# Patient Record
Sex: FEMALE | Race: WHITE | Employment: FULL TIME | URBAN - METROPOLITAN AREA
[De-identification: names, ages, dates, MRNs, and addresses within clinical notes are randomized per-mention and may not be internally consistent; named-entity substitution may affect disease eponyms.]

---

## 2017-03-19 ENCOUNTER — HOSPITAL ENCOUNTER (OUTPATIENT)
Age: 62
Discharge: HOME | End: 2017-03-19
Payer: COMMERCIAL

## 2017-03-19 VITALS
HEART RATE: 61 BPM | OXYGEN SATURATION: 95 % | WEIGHT: 220 LBS | SYSTOLIC BLOOD PRESSURE: 122 MMHG | RESPIRATION RATE: 16 BRPM | DIASTOLIC BLOOD PRESSURE: 56 MMHG | TEMPERATURE: 99 F

## 2017-03-19 DIAGNOSIS — T50.Z95A VACCINATION REACTION, INITIAL ENCOUNTER: Primary | ICD-10-CM

## 2017-03-19 PROCEDURE — 99212 OFFICE O/P EST SF 10 MIN: CPT

## 2017-03-19 PROCEDURE — 99202 OFFICE O/P NEW SF 15 MIN: CPT

## 2017-03-19 NOTE — ED PROVIDER NOTES
Patient presents with:  Rash Skin Problem (integumentary)      HPI:     Melody Betts is a 62year old female who presents today with a chief complaint of redness, swelling, and irritation at the injection site of her shingles vaccine.   She received t 122/56 mmHg  Pulse 61  Temp(Src) 98.6 °F (37 °C) (Temporal)  Resp 16  Wt 99.791 kg  SpO2 95%  GENERAL: well developed, well nourished, well hydrated, no distress  SKIN: good skin turgor, see above description for rash. No hives.  No swelling to the face or

## 2017-03-19 NOTE — ED INITIAL ASSESSMENT (HPI)
Pt reports pain, swelling and itching to left lateral upper arm after receiving shingles vaccination on Friday. Pt denies SOB. Denies nausea/vomiting.  Afebrile

## 2017-03-24 PROBLEM — E66.9 OBESITY: Status: ACTIVE | Noted: 2017-03-24

## 2017-03-24 PROBLEM — E78.00 PURE HYPERCHOLESTEROLEMIA: Status: ACTIVE | Noted: 2017-03-24

## 2017-10-05 PROBLEM — M54.6 THORACIC SPINE PAIN: Status: ACTIVE | Noted: 2017-10-05

## 2017-10-05 PROBLEM — M25.561 CHRONIC PAIN OF RIGHT KNEE: Status: ACTIVE | Noted: 2017-10-05

## 2017-10-05 PROBLEM — G89.29 CHRONIC PAIN OF RIGHT KNEE: Status: ACTIVE | Noted: 2017-10-05

## 2017-10-26 ENCOUNTER — HOSPITAL ENCOUNTER (OUTPATIENT)
Dept: GENERAL RADIOLOGY | Age: 62
Discharge: HOME | End: 2017-10-26
Attending: ORTHOPAEDIC SURGERY
Payer: COMMERCIAL

## 2017-10-26 DIAGNOSIS — M25.561 RIGHT KNEE PAIN: ICD-10-CM

## 2017-10-26 PROCEDURE — 73562 X-RAY EXAM OF KNEE 3: CPT | Performed by: ORTHOPAEDIC SURGERY

## 2017-11-05 ENCOUNTER — HOSPITAL ENCOUNTER (OUTPATIENT)
Dept: MRI IMAGING | Age: 62
Discharge: HOME | End: 2017-11-05
Attending: ORTHOPAEDIC SURGERY
Payer: COMMERCIAL

## 2017-11-05 DIAGNOSIS — S83.104A ACUTE TRAUMATIC INTERNAL DERANGEMENT OF KNEE, RIGHT: ICD-10-CM

## 2017-11-05 DIAGNOSIS — S83.249A TORN MEDIAL MENISCUS: ICD-10-CM

## 2017-11-05 PROCEDURE — 73721 MRI JNT OF LWR EXTRE W/O DYE: CPT | Performed by: ORTHOPAEDIC SURGERY

## 2018-01-11 NOTE — H&P
[facility]    PATIENT'S NAME: Armando FELIX PHYSICIAN: Pio Boone MD   PATIENT ACCOUNT#:   [de-identified]    LOCATION:  [facility]  MEDICAL RECORD #:   [id number]       YOB: 1955  ADMISSION DATE:       01/12/201 surgical management with arthroscopy and partial medial meniscectomy recognizing that some degree of osteoarthritic involvement of the knee would be identified.   The patient was advised this could be carried out on an elective basis, and with this in mind INTACT.       DIAGNOSIS: MEDIAL MENISCUS TEAR OF RIGHT KNEE. PLAN: RIGHT KNEE ARTHROSCOPY WITH PARTIAL MEDIAL MENISCECTOMY.       Dictated By Chiquita Freed.  MD Mely  d: 01/11/2018 07:35:06  t: 01/11/2018 08:35:16  [facility] [id number]  [facility]/

## 2018-01-12 ENCOUNTER — ANESTHESIA EVENT (OUTPATIENT)
Dept: SURGERY | Facility: HOSPITAL | Age: 63
End: 2018-01-12
Payer: COMMERCIAL

## 2018-01-12 ENCOUNTER — SURGERY (OUTPATIENT)
Age: 63
End: 2018-01-12

## 2018-01-12 ENCOUNTER — HOSPITAL ENCOUNTER (OUTPATIENT)
Facility: HOSPITAL | Age: 63
Setting detail: HOSPITAL OUTPATIENT SURGERY
Discharge: HOME | End: 2018-01-12
Attending: ORTHOPAEDIC SURGERY | Admitting: ORTHOPAEDIC SURGERY
Payer: COMMERCIAL

## 2018-01-12 ENCOUNTER — ANESTHESIA (OUTPATIENT)
Dept: SURGERY | Facility: HOSPITAL | Age: 63
End: 2018-01-12
Payer: COMMERCIAL

## 2018-01-12 VITALS
DIASTOLIC BLOOD PRESSURE: 55 MMHG | RESPIRATION RATE: 18 BRPM | SYSTOLIC BLOOD PRESSURE: 105 MMHG | OXYGEN SATURATION: 94 % | TEMPERATURE: 98 F | HEART RATE: 78 BPM | HEIGHT: 63 IN | WEIGHT: 226 LBS | BODY MASS INDEX: 40.04 KG/M2

## 2018-01-12 DIAGNOSIS — M25.561 CHRONIC PAIN OF RIGHT KNEE: Primary | ICD-10-CM

## 2018-01-12 DIAGNOSIS — S83.241A ACUTE MEDIAL MENISCUS TEAR OF RIGHT KNEE, INITIAL ENCOUNTER: ICD-10-CM

## 2018-01-12 DIAGNOSIS — G89.29 CHRONIC PAIN OF RIGHT KNEE: Primary | ICD-10-CM

## 2018-01-12 PROCEDURE — 0SBC4ZZ EXCISION OF RIGHT KNEE JOINT, PERCUTANEOUS ENDOSCOPIC APPROACH: ICD-10-PCS | Performed by: ORTHOPAEDIC SURGERY

## 2018-01-12 RX ORDER — NALOXONE HYDROCHLORIDE 0.4 MG/ML
80 INJECTION, SOLUTION INTRAMUSCULAR; INTRAVENOUS; SUBCUTANEOUS AS NEEDED
Status: DISCONTINUED | OUTPATIENT
Start: 2018-01-12 | End: 2018-01-12

## 2018-01-12 RX ORDER — BUPIVACAINE HYDROCHLORIDE 5 MG/ML
INJECTION, SOLUTION EPIDURAL; INTRACAUDAL AS NEEDED
Status: DISCONTINUED | OUTPATIENT
Start: 2018-01-12 | End: 2018-01-12 | Stop reason: HOSPADM

## 2018-01-12 RX ORDER — METOCLOPRAMIDE 10 MG/1
10 TABLET ORAL ONCE
Status: COMPLETED | OUTPATIENT
Start: 2018-01-12 | End: 2018-01-12

## 2018-01-12 RX ORDER — HYDROMORPHONE HYDROCHLORIDE 1 MG/ML
0.2 INJECTION, SOLUTION INTRAMUSCULAR; INTRAVENOUS; SUBCUTANEOUS EVERY 5 MIN PRN
Status: DISCONTINUED | OUTPATIENT
Start: 2018-01-12 | End: 2018-01-12

## 2018-01-12 RX ORDER — HALOPERIDOL 5 MG/ML
0.25 INJECTION INTRAMUSCULAR ONCE AS NEEDED
Status: DISCONTINUED | OUTPATIENT
Start: 2018-01-12 | End: 2018-01-12

## 2018-01-12 RX ORDER — ONDANSETRON 2 MG/ML
4 INJECTION INTRAMUSCULAR; INTRAVENOUS ONCE AS NEEDED
Status: DISCONTINUED | OUTPATIENT
Start: 2018-01-12 | End: 2018-01-12

## 2018-01-12 RX ORDER — HYDROCODONE BITARTRATE AND ACETAMINOPHEN 5; 325 MG/1; MG/1
2 TABLET ORAL EVERY 4 HOURS PRN
Status: DISCONTINUED | OUTPATIENT
Start: 2018-01-12 | End: 2018-01-12

## 2018-01-12 RX ORDER — EPHEDRINE SULFATE 50 MG/ML
INJECTION, SOLUTION INTRAVENOUS AS NEEDED
Status: DISCONTINUED | OUTPATIENT
Start: 2018-01-12 | End: 2018-01-12 | Stop reason: SURG

## 2018-01-12 RX ORDER — SODIUM CHLORIDE, SODIUM LACTATE, POTASSIUM CHLORIDE, CALCIUM CHLORIDE 600; 310; 30; 20 MG/100ML; MG/100ML; MG/100ML; MG/100ML
INJECTION, SOLUTION INTRAVENOUS CONTINUOUS
Status: DISCONTINUED | OUTPATIENT
Start: 2018-01-12 | End: 2018-01-12

## 2018-01-12 RX ORDER — DEXAMETHASONE SODIUM PHOSPHATE 4 MG/ML
VIAL (ML) INJECTION AS NEEDED
Status: DISCONTINUED | OUTPATIENT
Start: 2018-01-12 | End: 2018-01-12 | Stop reason: SURG

## 2018-01-12 RX ORDER — FAMOTIDINE 20 MG/1
20 TABLET ORAL ONCE
Status: COMPLETED | OUTPATIENT
Start: 2018-01-12 | End: 2018-01-12

## 2018-01-12 RX ORDER — HYDROMORPHONE HYDROCHLORIDE 1 MG/ML
0.6 INJECTION, SOLUTION INTRAMUSCULAR; INTRAVENOUS; SUBCUTANEOUS EVERY 5 MIN PRN
Status: DISCONTINUED | OUTPATIENT
Start: 2018-01-12 | End: 2018-01-12

## 2018-01-12 RX ORDER — HYDROCODONE BITARTRATE AND ACETAMINOPHEN 5; 325 MG/1; MG/1
1 TABLET ORAL EVERY 4 HOURS PRN
Status: DISCONTINUED | OUTPATIENT
Start: 2018-01-12 | End: 2018-01-12

## 2018-01-12 RX ORDER — MIDAZOLAM HYDROCHLORIDE 1 MG/ML
INJECTION INTRAMUSCULAR; INTRAVENOUS AS NEEDED
Status: DISCONTINUED | OUTPATIENT
Start: 2018-01-12 | End: 2018-01-12 | Stop reason: SURG

## 2018-01-12 RX ORDER — MORPHINE SULFATE 10 MG/ML
6 INJECTION, SOLUTION INTRAMUSCULAR; INTRAVENOUS EVERY 10 MIN PRN
Status: DISCONTINUED | OUTPATIENT
Start: 2018-01-12 | End: 2018-01-12

## 2018-01-12 RX ORDER — ACETAMINOPHEN 325 MG/1
650 TABLET ORAL EVERY 4 HOURS PRN
Status: DISCONTINUED | OUTPATIENT
Start: 2018-01-12 | End: 2018-01-12

## 2018-01-12 RX ORDER — ONDANSETRON 2 MG/ML
INJECTION INTRAMUSCULAR; INTRAVENOUS AS NEEDED
Status: DISCONTINUED | OUTPATIENT
Start: 2018-01-12 | End: 2018-01-12 | Stop reason: SURG

## 2018-01-12 RX ORDER — HYDROCODONE BITARTRATE AND ACETAMINOPHEN 5; 325 MG/1; MG/1
2 TABLET ORAL AS NEEDED
Status: DISCONTINUED | OUTPATIENT
Start: 2018-01-12 | End: 2018-01-12

## 2018-01-12 RX ORDER — MORPHINE SULFATE 4 MG/ML
4 INJECTION, SOLUTION INTRAMUSCULAR; INTRAVENOUS EVERY 10 MIN PRN
Status: DISCONTINUED | OUTPATIENT
Start: 2018-01-12 | End: 2018-01-12

## 2018-01-12 RX ORDER — LIDOCAINE HYDROCHLORIDE 10 MG/ML
INJECTION, SOLUTION EPIDURAL; INFILTRATION; INTRACAUDAL; PERINEURAL AS NEEDED
Status: DISCONTINUED | OUTPATIENT
Start: 2018-01-12 | End: 2018-01-12 | Stop reason: SURG

## 2018-01-12 RX ORDER — KETOROLAC TROMETHAMINE 30 MG/ML
INJECTION, SOLUTION INTRAMUSCULAR; INTRAVENOUS AS NEEDED
Status: DISCONTINUED | OUTPATIENT
Start: 2018-01-12 | End: 2018-01-12 | Stop reason: SURG

## 2018-01-12 RX ORDER — GLYCOPYRROLATE 0.2 MG/ML
INJECTION INTRAMUSCULAR; INTRAVENOUS AS NEEDED
Status: DISCONTINUED | OUTPATIENT
Start: 2018-01-12 | End: 2018-01-12 | Stop reason: SURG

## 2018-01-12 RX ORDER — HYDROMORPHONE HYDROCHLORIDE 1 MG/ML
0.4 INJECTION, SOLUTION INTRAMUSCULAR; INTRAVENOUS; SUBCUTANEOUS EVERY 5 MIN PRN
Status: DISCONTINUED | OUTPATIENT
Start: 2018-01-12 | End: 2018-01-12

## 2018-01-12 RX ORDER — HYDROCODONE BITARTRATE AND ACETAMINOPHEN 5; 325 MG/1; MG/1
1 TABLET ORAL AS NEEDED
Status: DISCONTINUED | OUTPATIENT
Start: 2018-01-12 | End: 2018-01-12

## 2018-01-12 RX ORDER — MORPHINE SULFATE 2 MG/ML
2 INJECTION, SOLUTION INTRAMUSCULAR; INTRAVENOUS EVERY 10 MIN PRN
Status: DISCONTINUED | OUTPATIENT
Start: 2018-01-12 | End: 2018-01-12

## 2018-01-12 RX ADMIN — KETOROLAC TROMETHAMINE 15 MG: 30 INJECTION, SOLUTION INTRAMUSCULAR; INTRAVENOUS at 08:25:00

## 2018-01-12 RX ADMIN — DEXAMETHASONE SODIUM PHOSPHATE 4 MG: 4 MG/ML VIAL (ML) INJECTION at 07:29:00

## 2018-01-12 RX ADMIN — EPHEDRINE SULFATE 5 MG: 50 INJECTION, SOLUTION INTRAVENOUS at 07:46:00

## 2018-01-12 RX ADMIN — SODIUM CHLORIDE, SODIUM LACTATE, POTASSIUM CHLORIDE, CALCIUM CHLORIDE: 600; 310; 30; 20 INJECTION, SOLUTION INTRAVENOUS at 07:27:00

## 2018-01-12 RX ADMIN — MIDAZOLAM HYDROCHLORIDE 2 MG: 1 INJECTION INTRAMUSCULAR; INTRAVENOUS at 07:27:00

## 2018-01-12 RX ADMIN — LIDOCAINE HYDROCHLORIDE 50 MG: 10 INJECTION, SOLUTION EPIDURAL; INFILTRATION; INTRACAUDAL; PERINEURAL at 07:29:00

## 2018-01-12 RX ADMIN — GLYCOPYRROLATE 0.2 MG: 0.2 INJECTION INTRAMUSCULAR; INTRAVENOUS at 07:28:00

## 2018-01-12 RX ADMIN — EPHEDRINE SULFATE 5 MG: 50 INJECTION, SOLUTION INTRAVENOUS at 07:38:00

## 2018-01-12 RX ADMIN — SODIUM CHLORIDE, SODIUM LACTATE, POTASSIUM CHLORIDE, CALCIUM CHLORIDE: 600; 310; 30; 20 INJECTION, SOLUTION INTRAVENOUS at 08:35:00

## 2018-01-12 RX ADMIN — ONDANSETRON 4 MG: 2 INJECTION INTRAMUSCULAR; INTRAVENOUS at 07:29:00

## 2018-01-12 NOTE — ANESTHESIA POSTPROCEDURE EVALUATION
Patient: You Harrison    Procedure Summary     Date:  01/12/18 Room / Location:  [address] [place] [address] MAIN OR 03 / [facility] OR    Anesthesia Start:  5812 Anesthesia Stop:  2729    Procedure:  KNEE ARTHROSCOPY (Right Knee) Diagnosis:  (medial meniscus tear right knee,

## 2018-01-12 NOTE — INTERVAL H&P NOTE
Pre-op Diagnosis: medial meniscus tear right knee    The above referenced H&P was reviewed by Carmen Orr MD on 1/12/2018, the patient was examined and no significant changes have occurred in the patient's condition since the H&P was performed.   I discus

## 2018-01-12 NOTE — BRIEF OP NOTE
[address] UNIT  Brief Op Note     Deandre Costa Location: OR   [facility] [id number] MRN [id number]   Admission Date 1/12/2018 Operation Date 1/12/2018   Attending Physician Zi Garcia MD Operating Physician Yanet Sousa MD

## 2018-01-12 NOTE — ANESTHESIA PREPROCEDURE EVALUATION
Anesthesia PreOp Note    HPI:     Ruy Starr is a 65year old female who presents for preoperative consultation requested by: Paul Gonzalez MD    Date of Surgery: 1/12/2018    Procedure(s):  KNEE ARTHROSCOPY  Indication: medial meniscus tear right status: Never Smoker    Smokeless tobacco: Never Used    Alcohol use Yes    Comment: very rare    Drug use: No    Sexual activity: Not on file     Other Topics Concern   None on file     Social History Narrative   None on file       Available pre-op labs r OBCOLETTE  1/12/2018 7:18 AM

## 2018-01-15 NOTE — OPERATIVE REPORT
[facility]    PATIENT'S NAME: Armando FELIX PHYSICIAN: Pio Boone MD   OPERATING PHYSICIAN: Pio Boone MD   PATIENT ACCOUNT#:   [de-identified]    LOCATION:  [address]  MEDICAL RECORD #:   [id number]       DATE a 19-gauge needle was delivered to the knee joint, which was then injected with 60 mL of normal saline. A  suprapatellar cannula with sharp trocar was then placed through the superolateral portal into the knee joint with free flow of saline.   Saline inflo meniscus extending to the junction with the body. The medial meniscus lesion was next addressed using a 4 mm Arthrex Torpedo powered shaver which was used to perform initial resection of the meniscus tear.   Repeat probe exam showed residual areas of invol involving less than 50% of the articular surface. The lateral femoral condyle was unremarkable.   The lateral meniscus had some mild free edge fraying, but on further inspection and probe exam of the posterior horn, undersurface, and periphery, no tears we

## 2018-01-26 ENCOUNTER — OFFICE VISIT (OUTPATIENT)
Dept: PHYSICAL THERAPY | Age: 63
End: 2018-01-26
Attending: ORTHOPAEDIC SURGERY
Payer: COMMERCIAL

## 2018-01-26 DIAGNOSIS — M23.000 CYST OF LATERAL MENISCUS OF RIGHT KNEE: ICD-10-CM

## 2018-01-26 PROCEDURE — 97161 PT EVAL LOW COMPLEX 20 MIN: CPT | Performed by: PHYSICAL THERAPIST

## 2018-01-26 PROCEDURE — 97110 THERAPEUTIC EXERCISES: CPT | Performed by: PHYSICAL THERAPIST

## 2018-01-26 NOTE — PROGRESS NOTES
LOWER EXTREMITY EVALUATION:   Referring Physician: Dr. Anabel Vyas  Date of Onset: 1/1218 Date of Service: 1/26/2018   Diagnosis: R knee menisectomy  PATIENT SUMMARY:   Janet Hughes is a 65year old y/o female who presents to therapy today with complaints Knee Foot/Ankle   Flexion: R 4/5; L 5/5  Extension: R 4/5; L 5/5  Abduction: R 4/5; L 4+/5   Flexion: R 4 minus/5; L 5/5  Extension: R 4minus/5; L 5/5    DF: R 5/5; L 5/5  PF: R 5/5; L 5/5  INV: R 5/5; L 5/5  EV: R 5/5; L 5/5         Balance: SLS: R 2 seco furnished under this plan of treatment and while under my care.     X___________________________________________________Date______________    Certification From: 9/74/3470  To:4/26/2018

## 2018-01-31 ENCOUNTER — OFFICE VISIT (OUTPATIENT)
Dept: PHYSICAL THERAPY | Age: 63
End: 2018-01-31
Attending: ORTHOPAEDIC SURGERY
Payer: COMMERCIAL

## 2018-01-31 PROCEDURE — 97110 THERAPEUTIC EXERCISES: CPT | Performed by: PHYSICAL THERAPIST

## 2018-01-31 NOTE — PROGRESS NOTES
Diagnosis: R knee menisectomy   # of Visits:  2        Next MD visit: none scheduled  Fall Risk: standard         Precautions: n/a           Medication Changes since last visit?: No  Subjective: Patient reports moving better and no pain at the knee    Obje

## 2018-02-01 ENCOUNTER — APPOINTMENT (OUTPATIENT)
Dept: PHYSICAL THERAPY | Age: 63
End: 2018-02-01
Attending: ORTHOPAEDIC SURGERY
Payer: COMMERCIAL

## 2018-02-06 ENCOUNTER — OFFICE VISIT (OUTPATIENT)
Dept: PHYSICAL THERAPY | Age: 63
End: 2018-02-06
Attending: ORTHOPAEDIC SURGERY
Payer: COMMERCIAL

## 2018-02-06 PROCEDURE — 97110 THERAPEUTIC EXERCISES: CPT | Performed by: PHYSICAL THERAPIST

## 2018-02-06 NOTE — PROGRESS NOTES
Diagnosis: R knee menisectomy   # of Visits:  4      Next MD visit: none scheduled  Fall Risk: standard         Precautions: n/a           Medication Changes since last visit?: No  Subjective: Patient reports the knee has been feeling good, really no pain

## 2018-02-08 ENCOUNTER — OFFICE VISIT (OUTPATIENT)
Dept: PHYSICAL THERAPY | Age: 63
End: 2018-02-08
Attending: ORTHOPAEDIC SURGERY
Payer: COMMERCIAL

## 2018-02-08 PROCEDURE — 97110 THERAPEUTIC EXERCISES: CPT

## 2018-02-13 ENCOUNTER — OFFICE VISIT (OUTPATIENT)
Dept: PHYSICAL THERAPY | Age: 63
End: 2018-02-13
Attending: ORTHOPAEDIC SURGERY
Payer: COMMERCIAL

## 2018-02-13 PROCEDURE — 97110 THERAPEUTIC EXERCISES: CPT

## 2018-02-13 NOTE — PROGRESS NOTES
Diagnosis: R knee menisectomy   # of Visits:  6      Next MD visit: none scheduled  Fall Risk: standard         Precautions: n/a           Medication Changes since last visit?: No   Subjective: Patient stated that her knee is feeling much better and did a

## 2018-02-14 ENCOUNTER — OFFICE VISIT (OUTPATIENT)
Dept: PHYSICAL THERAPY | Age: 63
End: 2018-02-14
Attending: ORTHOPAEDIC SURGERY
Payer: COMMERCIAL

## 2018-02-14 PROCEDURE — 97110 THERAPEUTIC EXERCISES: CPT | Performed by: PHYSICAL THERAPIST

## 2018-02-14 NOTE — PROGRESS NOTES
Diagnosis: R knee menisectomy   # of Visits:  7      Next MD visit: none scheduled  Fall Risk: standard         Precautions: n/a           Medication Changes since last visit?: No   Subjective: Patient reports no problems with driving.   States pain at knee

## 2018-02-20 ENCOUNTER — OFFICE VISIT (OUTPATIENT)
Dept: PHYSICAL THERAPY | Age: 63
End: 2018-02-20
Attending: ORTHOPAEDIC SURGERY
Payer: COMMERCIAL

## 2018-02-20 PROCEDURE — 97110 THERAPEUTIC EXERCISES: CPT

## 2018-02-20 NOTE — PROGRESS NOTES
Diagnosis: R knee menisectomy   # of Visits:  8     Next MD visit: none scheduled  Fall Risk: standard         Precautions: n/a           Medication Changes since last visit?: No   Subjective: Patient stated that she saw her MD last week and he wants her t strengthening LE's as per MD recommendation.       Charges: Ex 3       Total Timed Treatment: 45 min  Total Treatment Time: 45 min

## 2018-02-23 ENCOUNTER — APPOINTMENT (OUTPATIENT)
Dept: PHYSICAL THERAPY | Age: 63
End: 2018-02-23
Attending: ORTHOPAEDIC SURGERY
Payer: COMMERCIAL

## 2018-02-27 ENCOUNTER — OFFICE VISIT (OUTPATIENT)
Dept: PHYSICAL THERAPY | Age: 63
End: 2018-02-27
Attending: ORTHOPAEDIC SURGERY
Payer: COMMERCIAL

## 2018-02-27 PROCEDURE — 97110 THERAPEUTIC EXERCISES: CPT | Performed by: PHYSICAL THERAPIST

## 2018-02-27 NOTE — PROGRESS NOTES
Diagnosis: R knee menisectomy   # of Visits:  9    Next MD visit: March 14, 2018  Fall Risk: standard         Precautions: n/a           Medication Changes since last visit?: No   Subjective: Patient reports she would like to try the stairs as in choir and

## 2018-03-01 ENCOUNTER — OFFICE VISIT (OUTPATIENT)
Dept: PHYSICAL THERAPY | Age: 63
End: 2018-03-01
Attending: PEDIATRICS
Payer: COMMERCIAL

## 2018-03-01 PROCEDURE — 97110 THERAPEUTIC EXERCISES: CPT

## 2018-03-01 NOTE — PROGRESS NOTES
Diagnosis: R knee menisectomy   # of Visits:  10    Next MD visit: March 14, 2018  Fall Risk: standard         Precautions: n/a           Medication Changes since last visit?: No   Subjective: Patient stated that she is able to ambulate all day with no rosa isela

## 2018-03-06 ENCOUNTER — OFFICE VISIT (OUTPATIENT)
Dept: PHYSICAL THERAPY | Age: 63
End: 2018-03-06
Attending: PEDIATRICS
Payer: COMMERCIAL

## 2018-03-06 PROCEDURE — 97110 THERAPEUTIC EXERCISES: CPT | Performed by: PHYSICAL THERAPIST

## 2018-03-06 NOTE — PROGRESS NOTES
Diagnosis: R knee menisectomy   # of Visits:  11    Next MD visit: March 14, 2018  Fall Risk: standard         Precautions: n/a           Medication Changes since last visit?: No   Subjective: Patient stated the last 2 days the knee has been a little achy

## 2018-03-08 ENCOUNTER — OFFICE VISIT (OUTPATIENT)
Dept: PHYSICAL THERAPY | Age: 63
End: 2018-03-08
Attending: PEDIATRICS
Payer: COMMERCIAL

## 2018-03-08 PROCEDURE — 97110 THERAPEUTIC EXERCISES: CPT | Performed by: PHYSICAL THERAPIST

## 2018-03-08 NOTE — PROGRESS NOTES
Diagnosis: R knee menisectomy   # of Visits:  12    Next MD visit: March 14, 2018  Fall Risk: standard         Precautions: n/a           Medication Changes since last visit?: No   Subjective: Patient states knee has not been sore, stopped the rubbing at t

## 2018-03-13 ENCOUNTER — OFFICE VISIT (OUTPATIENT)
Dept: PHYSICAL THERAPY | Age: 63
End: 2018-03-13
Attending: PEDIATRICS
Payer: COMMERCIAL

## 2018-03-13 PROCEDURE — 97110 THERAPEUTIC EXERCISES: CPT | Performed by: PHYSICAL THERAPIST

## 2018-03-13 NOTE — PROGRESS NOTES
Diagnosis: R knee menisectomy   # of Visits:  13    Next MD visit: March 14, 2018  Fall Risk: standard         Precautions: n/a           Medication Changes since last visit?: No   Subjective: Patient states no pain at the knee today.   States will see the

## 2018-03-14 ENCOUNTER — OFFICE VISIT (OUTPATIENT)
Dept: PHYSICAL THERAPY | Age: 63
End: 2018-03-14
Attending: ORTHOPAEDIC SURGERY
Payer: COMMERCIAL

## 2018-03-14 PROCEDURE — 97110 THERAPEUTIC EXERCISES: CPT

## 2018-03-14 NOTE — PROGRESS NOTES
Diagnosis: R knee menisectomy   # of Visits:  15 / 25    Next MD visit: March 14, 2018  Fall Risk: standard         Precautions: n/a         DATE : 3/14/18  Medication Changes since last visit?: No   Subjective: Patient stated that he saw her MD today and

## 2018-03-16 ENCOUNTER — APPOINTMENT (OUTPATIENT)
Dept: PHYSICAL THERAPY | Age: 63
End: 2018-03-16
Attending: ORTHOPAEDIC SURGERY
Payer: COMMERCIAL

## 2018-03-20 ENCOUNTER — OFFICE VISIT (OUTPATIENT)
Dept: PHYSICAL THERAPY | Age: 63
End: 2018-03-20
Attending: ORTHOPAEDIC SURGERY
Payer: COMMERCIAL

## 2018-03-20 PROCEDURE — 97110 THERAPEUTIC EXERCISES: CPT | Performed by: PHYSICAL THERAPIST

## 2018-03-20 NOTE — PROGRESS NOTES
Diagnosis: R knee menisectomy   # of Visits:  13 / 18    Next MD visit: April 25, 2018  Fall Risk: standard         Precautions: n/a          Medication Changes since last visit?: No   Subjective: Patient states using her cane less now during the day.   Abl

## 2018-03-23 ENCOUNTER — OFFICE VISIT (OUTPATIENT)
Dept: PHYSICAL THERAPY | Age: 63
End: 2018-03-23
Attending: ORTHOPAEDIC SURGERY
Payer: COMMERCIAL

## 2018-03-23 PROCEDURE — 97110 THERAPEUTIC EXERCISES: CPT | Performed by: PHYSICAL THERAPIST

## 2018-03-23 NOTE — PROGRESS NOTES
Diagnosis: R knee menisectomy   # of Visits:  12 / 18    Next MD visit: April 25, 2018  Fall Risk: standard         Precautions: n/a          Medication Changes since last visit?: No   Subjective: Patient states she forgot the cane walking here from work a

## 2018-03-27 ENCOUNTER — OFFICE VISIT (OUTPATIENT)
Dept: PHYSICAL THERAPY | Age: 63
End: 2018-03-27
Attending: ORTHOPAEDIC SURGERY
Payer: COMMERCIAL

## 2018-03-27 PROCEDURE — 97110 THERAPEUTIC EXERCISES: CPT

## 2018-03-27 NOTE — PROGRESS NOTES
Diagnosis: R knee menisectomy   # of Visits:  16 / 18    Next MD visit: April 25, 2018  Fall Risk: standard         Precautions: n/a          Medication Changes since last visit?: No   Subjective: Patient reports that she continue to have same difficulty w Timed Treatment: 45 min  Total Treatment Time: 45 min

## 2018-03-30 ENCOUNTER — OFFICE VISIT (OUTPATIENT)
Dept: PHYSICAL THERAPY | Age: 63
End: 2018-03-30
Attending: ORTHOPAEDIC SURGERY
Payer: COMMERCIAL

## 2018-03-30 PROCEDURE — 97110 THERAPEUTIC EXERCISES: CPT | Performed by: PHYSICAL THERAPIST

## 2018-03-30 NOTE — PROGRESS NOTES
Diagnosis: R knee menisectomy   # of Visits:  25 / 18    Next MD visit: April 25, 2018  Fall Risk: standard         Precautions: n/a          Medication Changes since last visit?: No   REQUESTING 6-8 ADDITIONAL VISITS TO CONTINUE STRENGTHENING/BALANCE WORK

## 2018-04-03 ENCOUNTER — OFFICE VISIT (OUTPATIENT)
Dept: PHYSICAL THERAPY | Age: 63
End: 2018-04-03
Attending: ORTHOPAEDIC SURGERY
Payer: COMMERCIAL

## 2018-04-03 PROCEDURE — 97110 THERAPEUTIC EXERCISES: CPT

## 2018-04-03 NOTE — PROGRESS NOTES
Diagnosis: R knee menisectomy   # of Visits:  23 / 18    Next MD visit: April 25, 2018  Fall Risk: standard         Precautions: n/a          Medication Changes since last visit?: No       Subjective: Patient stated that her knee felt sore for two days aft

## 2018-04-05 ENCOUNTER — OFFICE VISIT (OUTPATIENT)
Dept: PHYSICAL THERAPY | Age: 63
End: 2018-04-05
Attending: ORTHOPAEDIC SURGERY
Payer: COMMERCIAL

## 2018-04-05 PROCEDURE — 97110 THERAPEUTIC EXERCISES: CPT | Performed by: PHYSICAL THERAPIST

## 2018-04-05 NOTE — PROGRESS NOTES
Diagnosis: R knee menisectomy   # of Visits:  2 / 8    Next MD visit: April 25, 2018  Fall Risk: standard         Precautions: n/a          Medication Changes since last visit?: No       Subjective: Patient states one episode of pain at knee last night but

## 2018-04-09 ENCOUNTER — APPOINTMENT (OUTPATIENT)
Dept: PHYSICAL THERAPY | Age: 63
End: 2018-04-09
Attending: ORTHOPAEDIC SURGERY
Payer: COMMERCIAL

## 2018-04-10 ENCOUNTER — OFFICE VISIT (OUTPATIENT)
Dept: PHYSICAL THERAPY | Age: 63
End: 2018-04-10
Attending: ORTHOPAEDIC SURGERY
Payer: COMMERCIAL

## 2018-04-10 PROCEDURE — 97110 THERAPEUTIC EXERCISES: CPT

## 2018-04-10 NOTE — PROGRESS NOTES
Diagnosis: R knee menisectomy   # of Visits:  3 / 8    Next MD visit: April 25, 2018  Fall Risk: standard         Precautions: n/a          Medication Changes since last visit?: No       Subjective: Patient reports that she hurt her back over the weekend a

## 2018-04-11 ENCOUNTER — APPOINTMENT (OUTPATIENT)
Dept: PHYSICAL THERAPY | Age: 63
End: 2018-04-11
Attending: ORTHOPAEDIC SURGERY
Payer: COMMERCIAL

## 2018-04-12 ENCOUNTER — OFFICE VISIT (OUTPATIENT)
Dept: PHYSICAL THERAPY | Age: 63
End: 2018-04-12
Attending: ORTHOPAEDIC SURGERY
Payer: COMMERCIAL

## 2018-04-12 PROCEDURE — 97110 THERAPEUTIC EXERCISES: CPT

## 2018-04-12 NOTE — PROGRESS NOTES
Diagnosis: R knee menisectomy   # of Visits:  4 / 8    Next MD visit: April 25, 2018  Fall Risk: standard         Precautions: n/a          Medication Changes since last visit?: No       Subjective: Patient reports that her mid back feels better but she st

## 2018-04-17 ENCOUNTER — OFFICE VISIT (OUTPATIENT)
Dept: PHYSICAL THERAPY | Age: 63
End: 2018-04-17
Attending: ORTHOPAEDIC SURGERY
Payer: COMMERCIAL

## 2018-04-17 PROCEDURE — 97110 THERAPEUTIC EXERCISES: CPT | Performed by: PHYSICAL THERAPIST

## 2018-04-17 NOTE — PROGRESS NOTES
Diagnosis: R knee menisectomy   # of Visits:  4 / 8    Next MD visit: April 25, 2018  Fall Risk: standard         Precautions: n/a          Medication Changes since last visit?: No       Subjective: Patient reports last weekend started to get pain with home

## 2018-04-19 ENCOUNTER — OFFICE VISIT (OUTPATIENT)
Dept: PHYSICAL THERAPY | Age: 63
End: 2018-04-19
Attending: ORTHOPAEDIC SURGERY
Payer: COMMERCIAL

## 2018-04-19 PROCEDURE — 97110 THERAPEUTIC EXERCISES: CPT

## 2018-04-19 NOTE — PROGRESS NOTES
Diagnosis: R knee menisectomy   # of Visits:  5 / 8    Next MD visit: April 25, 2018  Fall Risk: standard         Precautions: n/a          Medication Changes since last visit?: No       Subjective: patient reports no new c/o and denies any knee pain today

## 2018-04-24 ENCOUNTER — OFFICE VISIT (OUTPATIENT)
Dept: PHYSICAL THERAPY | Age: 63
End: 2018-04-24
Attending: ORTHOPAEDIC SURGERY
Payer: COMMERCIAL

## 2018-04-24 PROCEDURE — 97110 THERAPEUTIC EXERCISES: CPT | Performed by: PHYSICAL THERAPIST

## 2018-04-24 NOTE — PROGRESS NOTES
Diagnosis: R knee menisectomy   # of Visits:  6/ 8    Next MD visit: April 25, 2018  Fall Risk: standard         Precautions: n/a          Medication Changes since last visit?: No       Subjective: Patient reports Sunday knee felt a little weak, better tod

## 2018-05-01 ENCOUNTER — APPOINTMENT (OUTPATIENT)
Dept: PHYSICAL THERAPY | Age: 63
End: 2018-05-01
Attending: ORTHOPAEDIC SURGERY
Payer: COMMERCIAL

## 2018-05-02 ENCOUNTER — OFFICE VISIT (OUTPATIENT)
Dept: PHYSICAL THERAPY | Age: 63
End: 2018-05-02
Attending: ORTHOPAEDIC SURGERY
Payer: COMMERCIAL

## 2018-05-02 PROCEDURE — 97110 THERAPEUTIC EXERCISES: CPT | Performed by: PHYSICAL THERAPIST

## 2018-05-02 NOTE — PROGRESS NOTES
Diagnosis: R knee menisectomy   # of Visits:  6/ 8    Next MD visit: April 25, 2018  Fall Risk: standard         Precautions: n/a          Medication Changes since last visit?: No       Subjective: Patient reports did see Dr. Lucy Griggs who said to continue the

## 2018-05-03 ENCOUNTER — APPOINTMENT (OUTPATIENT)
Dept: PHYSICAL THERAPY | Age: 63
End: 2018-05-03
Attending: ORTHOPAEDIC SURGERY
Payer: COMMERCIAL

## 2018-05-08 ENCOUNTER — APPOINTMENT (OUTPATIENT)
Dept: PHYSICAL THERAPY | Age: 63
End: 2018-05-08
Attending: ORTHOPAEDIC SURGERY
Payer: COMMERCIAL

## 2018-05-10 ENCOUNTER — APPOINTMENT (OUTPATIENT)
Dept: PHYSICAL THERAPY | Age: 63
End: 2018-05-10
Attending: ORTHOPAEDIC SURGERY
Payer: COMMERCIAL

## 2018-05-11 ENCOUNTER — APPOINTMENT (OUTPATIENT)
Dept: PHYSICAL THERAPY | Age: 63
End: 2018-05-11
Attending: ORTHOPAEDIC SURGERY
Payer: COMMERCIAL

## 2018-05-15 ENCOUNTER — APPOINTMENT (OUTPATIENT)
Dept: PHYSICAL THERAPY | Age: 63
End: 2018-05-15
Attending: ORTHOPAEDIC SURGERY
Payer: COMMERCIAL

## 2018-05-22 ENCOUNTER — APPOINTMENT (OUTPATIENT)
Dept: PHYSICAL THERAPY | Age: 63
End: 2018-05-22
Attending: ORTHOPAEDIC SURGERY
Payer: COMMERCIAL

## 2018-05-23 ENCOUNTER — TELEPHONE (OUTPATIENT)
Dept: PHYSICAL THERAPY | Age: 63
End: 2018-05-23

## 2018-05-23 ENCOUNTER — APPOINTMENT (OUTPATIENT)
Dept: PHYSICAL THERAPY | Age: 63
End: 2018-05-23
Attending: ORTHOPAEDIC SURGERY
Payer: COMMERCIAL

## 2018-05-25 ENCOUNTER — OFFICE VISIT (OUTPATIENT)
Dept: PHYSICAL THERAPY | Age: 63
End: 2018-05-25
Attending: ORTHOPAEDIC SURGERY
Payer: COMMERCIAL

## 2018-05-25 PROCEDURE — 97110 THERAPEUTIC EXERCISES: CPT | Performed by: PHYSICAL THERAPIST

## 2018-05-25 NOTE — PROGRESS NOTES
Diagnosis: R knee menisectomy   # of Visits:  7/ 8    Next MD visit: April 25, 2018  Fall Risk: standard         Precautions: n/a          Medication Changes since last visit?: No     Subjective: Patient reports did do well with her conference and able to

## 2018-05-29 ENCOUNTER — OFFICE VISIT (OUTPATIENT)
Dept: PHYSICAL THERAPY | Age: 63
End: 2018-05-29
Attending: ORTHOPAEDIC SURGERY
Payer: COMMERCIAL

## 2018-05-29 PROCEDURE — 97110 THERAPEUTIC EXERCISES: CPT | Performed by: PHYSICAL THERAPIST

## 2018-05-29 NOTE — PROGRESS NOTES
Diagnosis: R knee menisectomy   # of Visits:  8/ 8    Next MD visit: April 25, 2018  Fall Risk: standard         Precautions: n/a          Medication Changes since last visit?: No     Subjective: Patient reports the knee has continued to be good.

## 2018-05-30 ENCOUNTER — APPOINTMENT (OUTPATIENT)
Dept: PHYSICAL THERAPY | Age: 63
End: 2018-05-30
Attending: ORTHOPAEDIC SURGERY
Payer: COMMERCIAL

## 2019-01-13 ENCOUNTER — HOSPITAL ENCOUNTER (OUTPATIENT)
Age: 64
Discharge: HOME | End: 2019-01-13
Payer: COMMERCIAL

## 2019-01-13 ENCOUNTER — APPOINTMENT (OUTPATIENT)
Dept: GENERAL RADIOLOGY | Age: 64
End: 2019-01-13
Attending: NURSE PRACTITIONER
Payer: COMMERCIAL

## 2019-01-13 VITALS
RESPIRATION RATE: 20 BRPM | DIASTOLIC BLOOD PRESSURE: 52 MMHG | HEART RATE: 66 BPM | HEIGHT: 64 IN | TEMPERATURE: 97 F | SYSTOLIC BLOOD PRESSURE: 133 MMHG | BODY MASS INDEX: 40.63 KG/M2 | WEIGHT: 238 LBS | OXYGEN SATURATION: 95 %

## 2019-01-13 DIAGNOSIS — J40 BRONCHITIS: Primary | ICD-10-CM

## 2019-01-13 PROCEDURE — 99214 OFFICE O/P EST MOD 30 MIN: CPT

## 2019-01-13 PROCEDURE — 71046 X-RAY EXAM CHEST 2 VIEWS: CPT | Performed by: NURSE PRACTITIONER

## 2019-01-13 PROCEDURE — 99213 OFFICE O/P EST LOW 20 MIN: CPT

## 2019-01-13 RX ORDER — AZITHROMYCIN 250 MG/1
TABLET, FILM COATED ORAL
Qty: 1 PACKAGE | Refills: 0 | Status: SHIPPED | OUTPATIENT
Start: 2019-01-13 | End: 2019-01-18

## 2019-01-13 RX ORDER — BENZONATATE 100 MG/1
200 CAPSULE ORAL 3 TIMES DAILY PRN
Qty: 30 CAPSULE | Refills: 0 | Status: SHIPPED | OUTPATIENT
Start: 2019-01-13 | End: 2019-02-12 | Stop reason: ALTCHOICE

## 2019-01-13 NOTE — ED INITIAL ASSESSMENT (HPI)
Off of work for 4-5 days with chest congestion and cough. Concerned about bronchitis. No documented fever but c/o chills and feeling achy. No chest pain or SOB.

## 2019-01-13 NOTE — ED PROVIDER NOTES
Patient presents with:  Cough/URI      HPI:     Donovan Regan is a 59year old female who presents for evaluation and management of a chief complaint of a productive cough with green and yellow sputum for the last 5 days.   She states she has had some ROS:   Positive for stated complaint: cough, congestion, chills  All other systems reviewed and negative except as noted above. Constitutional and Vital Signs Reviewed.     Physical Exam:     Findings:    /52   Pulse 66   Temp 97.2 °F (36.2 °C) acute disease in the chest.     Dictated by (CST): Carlito Blair MD on 1/13/2019 at 13:44     Approved by (CST): Carlito Blair MD on 1/13/2019 at 13:45          The patient is aware of the x-ray results.   We had a long discussion about viral versus bacte

## 2019-02-14 LAB — HM MAMMOGRAPHY BILATERAL: NORMAL

## 2019-11-19 PROBLEM — Z12.39 SCREENING FOR BREAST CANCER: Status: ACTIVE | Noted: 2019-11-19

## 2019-11-19 PROBLEM — M25.561 CHRONIC PAIN OF RIGHT KNEE: Status: RESOLVED | Noted: 2017-10-05 | Resolved: 2019-11-19

## 2019-11-19 PROBLEM — M54.6 THORACIC SPINE PAIN: Status: RESOLVED | Noted: 2017-10-05 | Resolved: 2019-11-19

## 2019-11-19 PROBLEM — L98.8 SKIN LESION OF BREAST: Status: ACTIVE | Noted: 2019-11-19

## 2019-11-19 PROBLEM — G89.29 CHRONIC PAIN OF RIGHT KNEE: Status: RESOLVED | Noted: 2017-10-05 | Resolved: 2019-11-19

## 2019-11-19 PROBLEM — L85.3 DRY SKIN: Status: ACTIVE | Noted: 2019-11-19

## 2019-11-19 PROBLEM — E66.9 OBESITY (BMI 35.0-39.9 WITHOUT COMORBIDITY): Status: ACTIVE | Noted: 2019-11-19

## 2019-11-22 ENCOUNTER — LAB ENCOUNTER (OUTPATIENT)
Dept: LAB | Age: 64
End: 2019-11-22
Attending: INTERNAL MEDICINE
Payer: COMMERCIAL

## 2019-11-22 DIAGNOSIS — L98.8 SKIN LESION OF BREAST: ICD-10-CM

## 2019-11-22 DIAGNOSIS — E78.00 PURE HYPERCHOLESTEROLEMIA: ICD-10-CM

## 2019-11-22 DIAGNOSIS — L85.3 DRY SKIN: ICD-10-CM

## 2019-11-22 DIAGNOSIS — E66.9 OBESITY (BMI 35.0-39.9 WITHOUT COMORBIDITY): ICD-10-CM

## 2019-11-22 PROCEDURE — 36415 COLL VENOUS BLD VENIPUNCTURE: CPT

## 2019-11-22 PROCEDURE — 80053 COMPREHEN METABOLIC PANEL: CPT

## 2019-11-22 PROCEDURE — 80061 LIPID PANEL: CPT

## 2019-11-22 PROCEDURE — 85025 COMPLETE CBC W/AUTO DIFF WBC: CPT

## 2019-11-22 PROCEDURE — 84443 ASSAY THYROID STIM HORMONE: CPT

## 2019-12-14 ENCOUNTER — HOSPITAL ENCOUNTER (OUTPATIENT)
Dept: MAMMOGRAPHY | Age: 64
Discharge: HOME | End: 2019-12-14
Attending: INTERNAL MEDICINE
Payer: COMMERCIAL

## 2019-12-14 DIAGNOSIS — Z12.39 SCREENING FOR BREAST CANCER: ICD-10-CM

## 2019-12-14 PROCEDURE — 77067 SCR MAMMO BI INCL CAD: CPT | Performed by: INTERNAL MEDICINE

## 2019-12-14 PROCEDURE — 77063 BREAST TOMOSYNTHESIS BI: CPT | Performed by: INTERNAL MEDICINE

## 2019-12-26 PROBLEM — E78.00 ELEVATED SERUM CHOLESTEROL: Status: ACTIVE | Noted: 2019-12-26

## 2019-12-26 PROBLEM — E78.9 ELEVATED SERUM CHOLESTEROL: Status: ACTIVE | Noted: 2019-12-26

## 2020-06-26 NOTE — PROGRESS NOTES
Diagnosis: R knee menisectomy   # of Visits:  5      Next MD visit: none scheduled  Fall Risk: standard         Precautions: n/a           Medication Changes since last visit?: No   Subjective: Patient stated that her knee feels sore since last visit\" fro Follow up phone call for Left breast biopsy performed on 6/24/2020.    Patient provided with pathology results by Dr. Pugh.   Patient denies any questions about pathology results.    Patient understands a surgical consult is needed.  Dr. Richard's office has been notified and will be calling patient to set up appointment.     Patient denies pain without the use of OTC analgesics.  Denies any bleeding or s/s of infection.  Reviewed s/s of infection with patient and instructed her to continue to watch for s/s of infection and to call if any develop, or if she has any other problems, questions, or concerns.  Patient verbalizes understanding and confirms that she has my contact numbers.

## 2022-11-20 ENCOUNTER — HOSPITAL ENCOUNTER (EMERGENCY)
Facility: HOSPITAL | Age: 67
Discharge: HOME | End: 2022-11-20
Attending: EMERGENCY MEDICINE
Payer: MEDICARE

## 2022-11-20 VITALS
TEMPERATURE: 98 F | BODY MASS INDEX: 38 KG/M2 | HEART RATE: 63 BPM | SYSTOLIC BLOOD PRESSURE: 117 MMHG | DIASTOLIC BLOOD PRESSURE: 66 MMHG | RESPIRATION RATE: 18 BRPM | WEIGHT: 223 LBS | OXYGEN SATURATION: 95 %

## 2022-11-20 DIAGNOSIS — S61.212A LACERATION OF RIGHT MIDDLE FINGER WITHOUT FOREIGN BODY WITHOUT DAMAGE TO NAIL, INITIAL ENCOUNTER: Primary | ICD-10-CM

## 2022-11-20 PROCEDURE — 12001 RPR S/N/AX/GEN/TRNK 2.5CM/<: CPT

## 2022-11-20 PROCEDURE — 99283 EMERGENCY DEPT VISIT LOW MDM: CPT

## 2022-11-20 PROCEDURE — 99282 EMERGENCY DEPT VISIT SF MDM: CPT

## 2022-11-20 NOTE — ED QUICK NOTES
bandaid applied to affected wound by tech. Pt a/ox4, respirations unlabored, speech full/clear, gait steady, no acute distress. All ED orders completed. Pt instructed to return to ED for any new/severe s/s or as directed by provider, and to see PMD/specialist ASAP or as directed by provider.

## 2023-12-04 ENCOUNTER — APPOINTMENT (OUTPATIENT)
Dept: OBGYN | Age: 68
End: 2023-12-04

## 2024-01-29 ENCOUNTER — APPOINTMENT (OUTPATIENT)
Dept: OBGYN | Age: 69
End: 2024-01-29

## 2024-01-29 VITALS
BODY MASS INDEX: 40.2 KG/M2 | HEART RATE: 63 BPM | DIASTOLIC BLOOD PRESSURE: 73 MMHG | OXYGEN SATURATION: 92 % | WEIGHT: 235.5 LBS | SYSTOLIC BLOOD PRESSURE: 115 MMHG | TEMPERATURE: 97.8 F | HEIGHT: 64 IN

## 2024-01-29 DIAGNOSIS — Z12.31 ENCOUNTER FOR SCREENING MAMMOGRAM FOR MALIGNANT NEOPLASM OF BREAST: ICD-10-CM

## 2024-01-29 DIAGNOSIS — Z01.419 ROUTINE GYNECOLOGICAL EXAMINATION: Primary | ICD-10-CM

## 2024-01-29 PROCEDURE — G0101 CA SCREEN;PELVIC/BREAST EXAM: HCPCS | Performed by: OBSTETRICS & GYNECOLOGY

## 2024-01-29 ASSESSMENT — PATIENT HEALTH QUESTIONNAIRE - PHQ9
SUM OF ALL RESPONSES TO PHQ9 QUESTIONS 1 AND 2: 0
2. FEELING DOWN, DEPRESSED OR HOPELESS: NOT AT ALL
1. LITTLE INTEREST OR PLEASURE IN DOING THINGS: NOT AT ALL
SUM OF ALL RESPONSES TO PHQ9 QUESTIONS 1 AND 2: 0
SUM OF ALL RESPONSES TO PHQ9 QUESTIONS 1 AND 2: 0
2. FEELING DOWN, DEPRESSED OR HOPELESS: NOT AT ALL
1. LITTLE INTEREST OR PLEASURE IN DOING THINGS: NOT AT ALL
SUM OF ALL RESPONSES TO PHQ9 QUESTIONS 1 AND 2: 0
CLINICAL INTERPRETATION OF PHQ2 SCORE: NO FURTHER SCREENING NEEDED
CLINICAL INTERPRETATION OF PHQ2 SCORE: NO FURTHER SCREENING NEEDED

## 2024-01-29 ASSESSMENT — ENCOUNTER SYMPTOMS
CONSTIPATION: 1
CONSTITUTIONAL NEGATIVE: 1

## 2024-04-21 ENCOUNTER — HOSPITAL ENCOUNTER (OUTPATIENT)
Age: 69
Discharge: HOME | End: 2024-04-21
Payer: MEDICARE

## 2024-04-21 VITALS
TEMPERATURE: 98 F | RESPIRATION RATE: 16 BRPM | OXYGEN SATURATION: 94 % | HEART RATE: 90 BPM | SYSTOLIC BLOOD PRESSURE: 137 MMHG | DIASTOLIC BLOOD PRESSURE: 79 MMHG

## 2024-04-21 DIAGNOSIS — R05.1 ACUTE COUGH: ICD-10-CM

## 2024-04-21 DIAGNOSIS — J98.01 BRONCHOSPASM: Primary | ICD-10-CM

## 2024-04-21 LAB — SARS-COV-2 RNA RESP QL NAA+PROBE: NOT DETECTED

## 2024-04-21 PROCEDURE — 99213 OFFICE O/P EST LOW 20 MIN: CPT

## 2024-04-21 RX ORDER — AZITHROMYCIN 250 MG/1
TABLET, FILM COATED ORAL
Qty: 6 TABLET | Refills: 0 | Status: SHIPPED | OUTPATIENT
Start: 2024-04-21 | End: 2024-04-26

## 2024-04-21 RX ORDER — PREDNISONE 20 MG/1
40 TABLET ORAL DAILY
Qty: 10 TABLET | Refills: 0 | Status: SHIPPED | OUTPATIENT
Start: 2024-04-21 | End: 2024-04-26

## 2024-04-21 RX ORDER — ALBUTEROL SULFATE 90 UG/1
2 AEROSOL, METERED RESPIRATORY (INHALATION) EVERY 4 HOURS PRN
Qty: 1 EACH | Refills: 0 | Status: SHIPPED | OUTPATIENT
Start: 2024-04-21 | End: 2024-05-21

## 2024-04-21 NOTE — ED INITIAL ASSESSMENT (HPI)
Patient with uri symptoms starting on Tuesday.  Reports forceful coughing spells starting on Thursday/Friday.

## 2024-04-21 NOTE — ED PROVIDER NOTES
Patient Seen in: [facility]      History     Chief Complaint   Patient presents with    Cough/URI     Stated Complaint: cough/uri    Subjective:   HPI    68-year-old female, with URI symptoms that began on Tuesday.  She reports forceful coughing spells where she begins coughing and cannot stop.  She states she is nearly vomiting with coughing.  She now has subjective fever chills.No CP No SOB history of bronchitis.  She does not smoke.  No ear pain no sinus discomfort.  Objective:   Past Medical History:    Back problem    shoulder pain    Constipation    Hyperlipidemia    Obesity              Past Surgical History:   Procedure Laterality Date    Appendectomy      Dilation/curettage,diagnostic      Hysterectomy      Total abdom hysterectomy                  No pertinent social history.            Review of Systems    Positive for stated complaint: cough/uri  Other systems are as noted in HPI.  Constitutional and vital signs reviewed.      All other systems reviewed and negative except as noted above.    Physical Exam     ED Triage Vitals [04/21/24 0951]   /79   Pulse 90   Resp 16   Temp 98 °F (36.7 °C)   Temp src Temporal   SpO2 94 %   O2 Device None (Room air)       Current:/79   Pulse 90   Temp 98 °F (36.7 °C) (Temporal)   Resp 16   SpO2 94%         Physical Exam  Vitals and nursing note reviewed.   Constitutional:       Appearance: Normal appearance. She is not ill-appearing.   HENT:      Right Ear: Tympanic membrane normal.      Left Ear: Tympanic membrane normal.      Nose: No congestion.      Mouth/Throat:      Pharynx: No posterior oropharyngeal erythema.   Eyes:      Extraocular Movements: Extraocular movements intact.      Pupils: Pupils are equal, round, and reactive to light.   Cardiovascular:      Rate and Rhythm: Normal rate.      Pulses: Normal pulses.      Heart sounds: Normal heart sounds.   Pulmonary:      Effort: Pulmonary effort is normal.      Breath sounds: Normal  breath sounds.   Musculoskeletal:      Cervical back: Normal range of motion.   Skin:     General: Skin is warm and dry.   Neurological:      Mental Status: She is alert.             ED Course     Labs Reviewed   RAPID SARS-COV-2 BY PCR - Normal                      MDM        68-year-old female presents with the above complaints  URI, COVID, influenza, bronchitis, bronchospasm, pneumonia considered  Sats are 95% on room air  Breath sounds are clear to auscultation patient is afebrile otherwise well-appearing and states he feels well  Advise prednisone albuterol patient does have Tessalon Per  If symptoms do not improve then start Z-Yady as patient states she has had bronchitis worsening to pneumonia and is concerned.    Close PMD follow-up strict return precautions given                                   Medical Decision Making  Problems Addressed:  Acute cough: acute illness or injury  Bronchospasm: acute illness or injury    Risk  OTC drugs.  Prescription drug management.        Disposition and Plan     Clinical Impression:  1. Bronchospasm    2. Acute cough         Disposition:  Discharge  4/21/2024 10:25 am    Follow-up:  Jabier Kendrick MD  [address] [place], [place] [id number]  [phone]    Schedule an appointment as soon as possible for a visit   If symptoms worsen return to the IC or go to the ER          Medications Prescribed:  Discharge Medication List as of 4/21/2024 10:25 AM        START taking these medications    Details   azithromycin (ZITHROMAX Z-YADY) 250 MG Oral Tab 500 mg once followed by 250 mg daily x 4 days, Normal, Disp-6 tablet, R-0      albuterol 108 (90 Base) MCG/ACT Inhalation Aero Soln Inhale 2 puffs into the lungs every 4 (four) hours as needed for Wheezing., Normal, Disp-1 each, R-0      predniSONE 20 MG Oral Tab Take 2 tablets (40 mg total) by mouth daily for 5 days., Normal, Disp-10 tablet, R-0

## 2025-03-19 ENCOUNTER — APPOINTMENT (OUTPATIENT)
Dept: GENERAL RADIOLOGY | Age: 70
End: 2025-03-19
Attending: PHYSICIAN ASSISTANT
Payer: MEDICARE

## 2025-03-19 ENCOUNTER — HOSPITAL ENCOUNTER (OUTPATIENT)
Age: 70
Discharge: HOME | End: 2025-03-19
Payer: MEDICARE

## 2025-03-19 VITALS
TEMPERATURE: 99 F | OXYGEN SATURATION: 97 % | DIASTOLIC BLOOD PRESSURE: 56 MMHG | SYSTOLIC BLOOD PRESSURE: 118 MMHG | HEART RATE: 94 BPM | RESPIRATION RATE: 18 BRPM

## 2025-03-19 DIAGNOSIS — J06.9 VIRAL URI WITH COUGH: Primary | ICD-10-CM

## 2025-03-19 PROCEDURE — 99214 OFFICE O/P EST MOD 30 MIN: CPT

## 2025-03-19 PROCEDURE — 99213 OFFICE O/P EST LOW 20 MIN: CPT

## 2025-03-19 PROCEDURE — 71046 X-RAY EXAM CHEST 2 VIEWS: CPT | Performed by: PHYSICIAN ASSISTANT

## 2025-03-19 RX ORDER — ALBUTEROL SULFATE 90 UG/1
2 INHALANT RESPIRATORY (INHALATION) EVERY 4 HOURS PRN
Qty: 1 EACH | Refills: 0 | Status: SHIPPED | OUTPATIENT
Start: 2025-03-19 | End: 2025-04-18

## 2025-03-19 RX ORDER — BENZONATATE 100 MG/1
100 CAPSULE ORAL 3 TIMES DAILY PRN
Qty: 21 CAPSULE | Refills: 0 | Status: SHIPPED | OUTPATIENT
Start: 2025-03-19 | End: 2025-03-26

## 2025-03-19 NOTE — ED PROVIDER NOTES
Patient Seen in: [facility]      History     Chief Complaint   Patient presents with    Cough/URI     Stated Complaint: cough    Subjective:   HPI    68 yo female here with elevation of cough which started 5 days ago.  Patient states initially the cough was somewhat dry, nonproductive.  Feels that there is a change in the cough yesterday and it was more harsh, frequent.  She has no associated fever/chills, chest pain or shortness of breath.  She denies body aches or other URI symptoms.  Will continue OTC Robitussin for the symptoms.  She has taken 2 home COVID test which were negative.    Objective:     Past Medical History:    Back problem    shoulder pain    Constipation    Hyperlipidemia    Obesity              Past Surgical History:   Procedure Laterality Date    Appendectomy      Dilation/curettage,diagnostic      Hysterectomy      Total abdom hysterectomy                  No pertinent social history.            Review of Systems    Positive for stated complaint: cough  Other systems are as noted in HPI.  Constitutional and vital signs reviewed.      All other systems reviewed and negative except as noted above.    Physical Exam     ED Triage Vitals [03/19/25 1334]   /56   Pulse 94   Resp 18   Temp 98.7 °F (37.1 °C)   Temp src Oral   SpO2 97 %   O2 Device None (Room air)       Current Vitals:   Vital Signs  BP: 118/56  Pulse: 94  Resp: 18  Temp: 98.7 °F (37.1 °C)  Temp src: Oral    Oxygen Therapy  SpO2: 97 %  O2 Device: None (Room air)        Physical Exam  Vitals and nursing note reviewed.   Constitutional:       General: She is not in acute distress.  HENT:      Head: Normocephalic and atraumatic.      Right Ear: External ear normal.      Left Ear: External ear normal.      Nose: Nose normal.      Mouth/Throat:      Mouth: Mucous membranes are moist.   Eyes:      Extraocular Movements: Extraocular movements intact.      Pupils: Pupils are equal, round, and reactive to light.    Cardiovascular:      Rate and Rhythm: Normal rate.   Pulmonary:      Effort: Pulmonary effort is normal.   Abdominal:      General: Abdomen is flat.   Musculoskeletal:         General: Normal range of motion.      Cervical back: Normal range of motion.   Skin:     General: Skin is warm.   Neurological:      General: No focal deficit present.      Mental Status: She is alert and oriented to person, place, and time.   Psychiatric:         Mood and Affect: Mood normal.         Behavior: Behavior normal.             ED Course   Labs Reviewed - No data to display     69-year-old female presenting for evaluation of cough x 5 days.  Patient is afebrile and very well-appearing.  There is no hypoxia, no tachycardia and the lungs are clear with no wheezing or rhonchi    Ddx-viral URI with cough, bronchitis, pneumonia    Chest x-ray obtained negative for infiltrate or abnormality.  We did discuss supportive care and monitoring. Tessalon, albuterol rx'd for cough    Pcp follow up and return precautions advised           MDM              Medical Decision Making      Disposition and Plan     Clinical Impression:  1. Viral URI with cough         Disposition:  Discharge  3/19/2025  2:46 pm    Follow-up:  Jabier Kendrick MD  [address] [place] [id number]  [phone]                Medications Prescribed:  Discharge Medication List as of 3/19/2025  2:47 PM        START taking these medications    Details   benzonatate 100 MG Oral Cap Take 1 capsule (100 mg total) by mouth 3 (three) times daily as needed for cough., Normal, Disp-21 capsule, R-0      albuterol 108 (90 Base) MCG/ACT Inhalation Aero Soln Inhale 2 puffs into the lungs every 4 (four) hours as needed for Wheezing., Normal, Disp-1 each, R-0                 Supplementary Documentation:

## 2025-03-19 NOTE — ED INITIAL ASSESSMENT (HPI)
Patient with cough since Saturday.  States cough began to feel like bronchitis yesterday.  Negative covid tests on Sunday and Tuesday.

## (undated) DEVICE — STANDARD HYPODERMIC NEEDLE,POLYPROPYLENE HUB: Brand: MONOJECT

## (undated) DEVICE — TRAY SRGPRP PVP IOD WT SCRB SM

## (undated) DEVICE — ZIMMER® STERILE DISPOSABLE TOURNIQUET CUFF WITH PLC, DUAL PORT, SINGLE BLADDER, 34 IN. (86 CM)

## (undated) DEVICE — STERILE LATEX POWDER-FREE SURGICAL GLOVESWITH NITRILE COATING: Brand: PROTEXIS

## (undated) DEVICE — SOL  .9 3000ML

## (undated) DEVICE — BLADE SHVR COOLCUT 13CM 4MM

## (undated) DEVICE — PETROLATUM GAUZE CISION DRESSING: Brand: VASELINE

## (undated) DEVICE — 10K ARTHROSCOPY INFLOW/OUTFLOW TUBE SET: Brand: 10K

## (undated) DEVICE — 12 ML SYRINGE LUER-LOCK TIP: Brand: MONOJECT

## (undated) DEVICE — ARTHROSCOPY: Brand: MEDLINE INDUSTRIES, INC.

## (undated) DEVICE — SUCTION CANISTER, 3000CC,SAFELINER: Brand: DEROYAL

## (undated) DEVICE — COVER SGL STRL LGHT HNDL BLU

## (undated) DEVICE — SUTURE ETHILON 3-0 669H

## (undated) DEVICE — 3M™ STERI-DRAPE™ INCISE DRAPE 1050 (60CM X 45CM): Brand: STERI-DRAPE™

## (undated) DEVICE — MATTRESS PT HOVERMATT 1/2L 34W

## (undated) NOTE — ED AVS SNAPSHOT
[facility] AND [facility] in [address] [place] [address] Corona Hassan    Phone:  [phone]    Fax:  [phone] [facility]   MRN: [id number]    Department:  [facility] AND [facility] in [facility]   Date of Visit: follow-up care and referrals. It is our goal to assure that you are completely satisfied with every aspect of your visit today.   In an effort to constantly improve our service to you, we would appreciate any positive or negative feedback related to the [employer] account. You may have had testing done that requires us to contact you. Please make sure we have your correct phone number on file.      OUR CURRENT HOURS OF OPERATION:  MONDAY THROUGH FRIDAY 8AM - 8PM  WEEKENDS AND HOLIDAYS 8AM - 6PM    I certifi Sign up for [employer], your secure online medical record. [employer] will allow you to access patient instructions from your recent visit,  view other health information, and more. To sign up or find more information, go to [email or web address] and cl

## (undated) NOTE — LETTER
Date & Time: 3/19/2025, 2:47 PM  Patient: Vanessa Gonzalez  Encounter Provider(s):    Carline Velasquez PA-C       To Whom It May Concern:    Vanessa Gonzalez was seen and treated in our department on 3/19/2025. She {Return to school/sport/work:[id number]}.    If you have any questions or concerns, please do not hesitate to call.        _____________________________  Physician/APC Signature

## (undated) NOTE — LETTER
Dear Dr. Yolanda Boone    This letter is to inform you that Marcello Chen has been attending Physical Therapy with me. See below for my most recent plan of care.      Diagnosis: R knee menisectomy   # of Visits:  18 / 18    Next MD visit: April 25, 201 4. Patient to report no pain at the R knee with daily activities. Plan: Continue strengthening work, balance work. Recommend 6-8 additional visits.        Charges: Ex 3       Total Timed Treatment: 45 min  Total Treatment Time: 45 min

## (undated) NOTE — LETTER
Date & Time: 1/13/2019, 2:04 PM  Patient: Adilene Self  Encounter Provider(s):    LESLIE Downs       To Whom It May Concern:    Rik Adorno was seen and treated in our department on 1/13/2019.  She should not return to work until [employer]

## (undated) NOTE — LETTER
Patient Name: Lu Merida  YOB: 1955          MRN number:  [id number]  Date:  1/26/2018  Referring Physician: Aram Boone     LOWER EXTREMITY EVALUATION:    Referring Physician: Dr. Chantal Carcamo  Date of Onset: 1/1218 Date of Service: 1/26/ 3. Patient to have strength at 4/5 or greater R quad to be able to go up/down stairs with normal pattern, step over into bathtub without limitation, walk on community basis with no assistive device.    4. Patient to report no pain at the R knee with daily a